# Patient Record
Sex: FEMALE | Race: WHITE | NOT HISPANIC OR LATINO | URBAN - METROPOLITAN AREA
[De-identification: names, ages, dates, MRNs, and addresses within clinical notes are randomized per-mention and may not be internally consistent; named-entity substitution may affect disease eponyms.]

---

## 2024-06-21 ENCOUNTER — TELEPHONE (OUTPATIENT)
Dept: NEUROLOGY | Facility: CLINIC | Age: 34
End: 2024-06-21

## 2024-06-21 NOTE — TELEPHONE ENCOUNTER
Called pt to schedule new patient oly with Dr. Sousa on 11/7/2024 @8am.  Pt provided health insurance information.   PT was inform to bring health insurance card     Thank you